# Patient Record
Sex: MALE | Race: WHITE | NOT HISPANIC OR LATINO | Employment: FULL TIME | URBAN - METROPOLITAN AREA
[De-identification: names, ages, dates, MRNs, and addresses within clinical notes are randomized per-mention and may not be internally consistent; named-entity substitution may affect disease eponyms.]

---

## 2024-03-08 ENCOUNTER — LAB (OUTPATIENT)
Dept: LAB | Facility: LAB | Age: 29
End: 2024-03-08
Payer: COMMERCIAL

## 2024-04-24 ENCOUNTER — OFFICE VISIT (OUTPATIENT)
Dept: PRIMARY CARE | Facility: CLINIC | Age: 29
End: 2024-04-24
Payer: COMMERCIAL

## 2024-04-24 VITALS — BODY MASS INDEX: 29.29 KG/M2 | WEIGHT: 210 LBS | SYSTOLIC BLOOD PRESSURE: 100 MMHG | DIASTOLIC BLOOD PRESSURE: 84 MMHG

## 2024-04-24 DIAGNOSIS — J45.909 ASTHMA, UNSPECIFIED ASTHMA SEVERITY, UNSPECIFIED WHETHER COMPLICATED, UNSPECIFIED WHETHER PERSISTENT (HHS-HCC): ICD-10-CM

## 2024-04-24 DIAGNOSIS — Z00.00 HEALTHCARE MAINTENANCE: Primary | ICD-10-CM

## 2024-04-24 DIAGNOSIS — R00.2 PALPITATIONS: ICD-10-CM

## 2024-04-24 PROBLEM — F99 PSYCHIATRIC SYMPTOMS: Status: ACTIVE | Noted: 2024-04-24

## 2024-04-24 PROBLEM — J03.91 RECURRENT TONSILLITIS: Status: ACTIVE | Noted: 2024-04-24

## 2024-04-24 PROBLEM — Z86.59 HISTORY OF MENTAL DISORDER: Status: ACTIVE | Noted: 2024-04-24

## 2024-04-24 PROBLEM — M79.631 PAIN OF RIGHT FOREARM: Status: ACTIVE | Noted: 2024-04-24

## 2024-04-24 PROBLEM — Z51.81 ENCOUNTER FOR THERAPEUTIC DRUG MONITORING: Status: ACTIVE | Noted: 2024-04-24

## 2024-04-24 PROBLEM — F39 MOOD DISORDER (CMS-HCC): Status: ACTIVE | Noted: 2021-11-30

## 2024-04-24 PROBLEM — J03.90 TONSILLITIS: Status: ACTIVE | Noted: 2024-04-24

## 2024-04-24 PROBLEM — G47.00 INSOMNIA: Status: ACTIVE | Noted: 2024-04-24

## 2024-04-24 PROBLEM — F98.8 ATTENTION DEFICIT DISORDER: Status: ACTIVE | Noted: 2022-01-07

## 2024-04-24 PROBLEM — F99 PSYCHIATRIC COMPLAINT: Status: ACTIVE | Noted: 2024-04-24

## 2024-04-24 PROBLEM — S62.001K: Status: ACTIVE | Noted: 2021-12-20

## 2024-04-24 PROBLEM — E66.3 OVERWEIGHT WITH BODY MASS INDEX (BMI) 25.0-29.9: Status: ACTIVE | Noted: 2024-04-24

## 2024-04-24 PROBLEM — F31.9 BIPOLAR DEPRESSION (MULTI): Status: ACTIVE | Noted: 2024-04-24

## 2024-04-24 PROBLEM — F31.30 BIPOLAR DISORDER, MOST RECENT EPISODE DEPRESSED (MULTI): Status: ACTIVE | Noted: 2024-04-24

## 2024-04-24 PROBLEM — F41.9 ANXIETY: Status: ACTIVE | Noted: 2024-04-24

## 2024-04-24 PROCEDURE — 99385 PREV VISIT NEW AGE 18-39: CPT | Performed by: STUDENT IN AN ORGANIZED HEALTH CARE EDUCATION/TRAINING PROGRAM

## 2024-04-24 PROCEDURE — 1036F TOBACCO NON-USER: CPT | Performed by: STUDENT IN AN ORGANIZED HEALTH CARE EDUCATION/TRAINING PROGRAM

## 2024-04-24 PROCEDURE — 93000 ELECTROCARDIOGRAM COMPLETE: CPT | Performed by: STUDENT IN AN ORGANIZED HEALTH CARE EDUCATION/TRAINING PROGRAM

## 2024-04-24 RX ORDER — DESVENLAFAXINE SUCCINATE 50 MG/1
TABLET, EXTENDED RELEASE ORAL
COMMUNITY
Start: 2023-07-18 | End: 2024-04-24 | Stop reason: ALTCHOICE

## 2024-04-24 RX ORDER — TRAZODONE HYDROCHLORIDE 150 MG/1
TABLET ORAL
COMMUNITY
Start: 2022-11-30 | End: 2024-04-24 | Stop reason: ALTCHOICE

## 2024-04-24 RX ORDER — DESVENLAFAXINE SUCCINATE 25 MG/1
TABLET, EXTENDED RELEASE ORAL
COMMUNITY
Start: 2023-06-23 | End: 2024-04-24 | Stop reason: ALTCHOICE

## 2024-04-24 RX ORDER — HYDROXYZINE PAMOATE 25 MG/1
CAPSULE ORAL
COMMUNITY
Start: 2021-11-30 | End: 2024-04-24 | Stop reason: ALTCHOICE

## 2024-04-24 RX ORDER — METHYLPHENIDATE HYDROCHLORIDE 20 MG/1
20 TABLET ORAL
COMMUNITY
Start: 2024-03-25 | End: 2024-04-24 | Stop reason: ALTCHOICE

## 2024-04-24 RX ORDER — BUSPIRONE HYDROCHLORIDE 15 MG/1
TABLET ORAL
COMMUNITY
Start: 2021-12-02 | End: 2024-04-24 | Stop reason: ALTCHOICE

## 2024-04-24 RX ORDER — LAMOTRIGINE 100 MG/1
TABLET ORAL
COMMUNITY
Start: 2024-04-04

## 2024-04-24 RX ORDER — METHYLPHENIDATE 2.2 MG/H
PATCH TRANSDERMAL
COMMUNITY
End: 2024-04-24 | Stop reason: ALTCHOICE

## 2024-04-24 RX ORDER — CITALOPRAM 10 MG/1
1 TABLET ORAL DAILY
COMMUNITY
Start: 2022-10-02 | End: 2024-04-24 | Stop reason: WASHOUT

## 2024-04-24 RX ORDER — OLANZAPINE 5 MG/1
5 TABLET ORAL
COMMUNITY
Start: 2022-07-16

## 2024-04-24 RX ORDER — LISDEXAMFETAMINE DIMESYLATE 60 MG/1
60 CAPSULE ORAL DAILY
COMMUNITY
Start: 2023-11-21 | End: 2024-04-24 | Stop reason: ALTCHOICE

## 2024-04-24 RX ORDER — METHYLPHENIDATE HYDROCHLORIDE 18 MG/1
TABLET ORAL
COMMUNITY
Start: 2021-12-30 | End: 2024-04-24 | Stop reason: ALTCHOICE

## 2024-04-24 RX ORDER — METHYLPHENIDATE 3.3 MG/H
PATCH TRANSDERMAL
COMMUNITY
Start: 2024-04-01

## 2024-04-24 RX ORDER — LAMOTRIGINE 25 MG/1
TABLET ORAL
COMMUNITY
Start: 2024-04-04

## 2024-04-24 RX ORDER — OLANZAPINE 2.5 MG/1
2.5 TABLET ORAL NIGHTLY
COMMUNITY
Start: 2023-08-14 | End: 2024-04-24 | Stop reason: ALTCHOICE

## 2024-04-24 RX ORDER — METHYLPHENIDATE HYDROCHLORIDE 10 MG/1
TABLET ORAL
COMMUNITY
Start: 2024-03-22

## 2024-04-24 RX ORDER — LISDEXAMFETAMINE DIMESYLATE 40 MG/1
1 TABLET, CHEWABLE ORAL DAILY
COMMUNITY
Start: 2023-11-08 | End: 2024-04-24 | Stop reason: ALTCHOICE

## 2024-04-24 NOTE — PROGRESS NOTES
Establish new and irregular heart beat 1xweek    Subjective   Patient ID: Scotty Pastrana is a 29 y.o. male who presents for Establish Care and Irregular Heart Beat.    HPI     Presents to establish care    Past medical history: Depression, ADHD  Past surgical history: Right wrist, tonsillectomy  No known drug allergies  Social history: Works for [employer], , been there about 1.5 years, went to [employer], graduated 2018, also had been loan advisor, nicotine pouches daily, several times, occasion alcohol use, no recreational drug use  Family history: Coronary artery disease, hypertension    Follows with psychiatry for depression and ADHD. Has been on a number of medications previously and kept switching to different ones to see if could be more beneficial Lamictal was the most recent addition and started at 25mg daily 2-3 months ago and increased up to current dose    Done 2 rounds of TMS, also neuro feedback    Over the last 1 to 2 weeks has noted occasional palpitations.  We usually feel 1 beat and then will go back to normal.  Has been more aware of the palpitations at night.  If he does control his breathing will have improvement with it.  Have occurred a little less often over the last few days.  Had been about every 20 minutes at times.  Notices less when he is out for walks.  No associated lightheadedness, dizziness, chest pain, shortness of breath    Review of Systems    8 point review of systems is otherwise negative unless mentioned on HPI      Objective   /84   Wt 95.3 kg (210 lb)   BMI 29.29 kg/m²     Physical Exam    General: No acute distress  HEENT: EOMI  CV: Regular rate and rhythm, normal S1 and S2, no murmurs  Pulm: Clear to auscultation bilaterally, no wheezings, rales or rhonchi  Abd: Nondistended  MSK: 5/5 strength in all extremities  Skin: No rashes   Lymphatic: No lymphadenopathy      Assessment/Plan        Occasional feeling of palpitations  -EKG with HR 83, normal sinus  rhythm, normal intervals, no ischemic changes   -Discussed most common etiology which would be PVCs  -Discussed option of wearing Holter monitor for 48 hours if palpitations change, become more frequent or bothersome  -Also consideration with most recent medication addition and up titration of Lamictal if could potentially be side effect that will discuss with his mental health provider    Depression  ADHD  -Follows with psychiatry  -Most recent medication addition was lamotrigine, was initially 25 mg daily and has been increased up to 125 mg daily  -On Ritalin for ADHD  -Also takes olanzapine 5 mg daily  -Reports being on different antidepressant medications in the past that were not effective.  Denies any side effects to particular antidepressants    RTC yearly or earlier as needed    This note was dictated by speech recognition. Minor errors in transcription may be present.

## 2024-08-14 ENCOUNTER — OFFICE VISIT (OUTPATIENT)
Dept: PRIMARY CARE | Facility: CLINIC | Age: 29
End: 2024-08-14
Payer: COMMERCIAL

## 2024-08-14 VITALS
DIASTOLIC BLOOD PRESSURE: 86 MMHG | BODY MASS INDEX: 30.24 KG/M2 | SYSTOLIC BLOOD PRESSURE: 134 MMHG | WEIGHT: 216 LBS | HEIGHT: 71 IN

## 2024-08-14 DIAGNOSIS — F31.30 BIPOLAR DISORDER, MOST RECENT EPISODE DEPRESSED (MULTI): ICD-10-CM

## 2024-08-14 DIAGNOSIS — R00.2 PALPITATIONS: Primary | ICD-10-CM

## 2024-08-14 DIAGNOSIS — R53.1 RIGHT SIDED WEAKNESS: ICD-10-CM

## 2024-08-14 PROCEDURE — 3008F BODY MASS INDEX DOCD: CPT | Performed by: STUDENT IN AN ORGANIZED HEALTH CARE EDUCATION/TRAINING PROGRAM

## 2024-08-14 PROCEDURE — 99213 OFFICE O/P EST LOW 20 MIN: CPT | Performed by: STUDENT IN AN ORGANIZED HEALTH CARE EDUCATION/TRAINING PROGRAM

## 2024-08-14 NOTE — PROGRESS NOTES
"Subjective   Patient ID: Scotty Pastrana is a 29 y.o. male who presents for Follow-up (Right wrist discomfort/\"Right side of body , feels weaker\").    HPI     Presents for follow-up    Has broken right wrist twice, has not been able to do push-ups since and can bother for time to time.    Has felt right side has been weaker than right for a number of years     Has still been having palpitations, more at night    Review of Systems    8 point review of systems is otherwise negative unless mentioned on HPI      Objective   /86   Ht 1.803 m (5' 11\")   Wt 98 kg (216 lb)   BMI 30.13 kg/m²     Physical Exam    General: No acute distress  HEENT: EOMI  CV: Regular rate and rhythm, normal S1 and S2, no murmurs  Pulm: Clear to auscultation bilaterally, no wheezings, rales or rhonchi  Abd: Nondistended  MSK: 5/5 strength in all extremities  Skin: No rashes   Lymphatic: No lymphadenopathy      Assessment/Plan       Occasional feeling of palpitations  -EKG with HR 83, normal sinus rhythm, normal intervals, no ischemic changes   -Discussed most common etiology which would be PVCs  -With symptoms still persisting, will order 7 day event monitor   -Also consideration with most recent medication addition and up titration of Lamictal if could potentially be side effect that he had discussed with his mental health provider    Feeling like right side of body is weaker than left  -Referral placed to neurology      Depression  ADHD  -Follows with psychiatry  -Most recent medication addition was lamotrigine, was initially 25 mg daily and has been increased up to 125 mg daily  -On Ritalin for ADHD  -Also takes olanzapine 5 mg daily  -Reports being on different antidepressant medications in the past that were not effective.  Denies any side effects to particular antidepressants     RTC yearly or earlier as needed     This note was dictated by speech recognition. Minor errors in transcription may be present.       "

## 2024-08-28 ENCOUNTER — ANCILLARY PROCEDURE (OUTPATIENT)
Dept: CARDIOLOGY | Facility: CLINIC | Age: 29
End: 2024-08-28
Payer: COMMERCIAL

## 2024-08-28 DIAGNOSIS — R00.2 PALPITATIONS: ICD-10-CM

## 2024-08-28 PROCEDURE — 93243 EXT ECG>48HR<7D SCAN A/R: CPT

## 2024-08-28 PROCEDURE — 93244 EXT ECG>48HR<7D REV&INTERPJ: CPT | Performed by: INTERNAL MEDICINE

## 2024-09-18 ENCOUNTER — PREP FOR PROCEDURE (OUTPATIENT)
Dept: ORTHOPEDIC SURGERY | Facility: CLINIC | Age: 29
End: 2024-09-18
Payer: COMMERCIAL

## 2024-09-20 ENCOUNTER — APPOINTMENT (OUTPATIENT)
Dept: PRIMARY CARE | Facility: CLINIC | Age: 29
End: 2024-09-20
Payer: COMMERCIAL

## 2024-11-05 ENCOUNTER — APPOINTMENT (OUTPATIENT)
Dept: OTOLARYNGOLOGY | Facility: CLINIC | Age: 29
End: 2024-11-05
Payer: COMMERCIAL

## 2024-11-05 VITALS
SYSTOLIC BLOOD PRESSURE: 134 MMHG | BODY MASS INDEX: 30.13 KG/M2 | DIASTOLIC BLOOD PRESSURE: 91 MMHG | TEMPERATURE: 97.5 F | HEIGHT: 71 IN

## 2024-11-05 DIAGNOSIS — J34.3 HYPERTROPHY OF BOTH INFERIOR NASAL TURBINATES: ICD-10-CM

## 2024-11-05 DIAGNOSIS — F41.9 ANXIETY: ICD-10-CM

## 2024-11-05 DIAGNOSIS — J30.9 CHRONIC ALLERGIC RHINITIS: Primary | ICD-10-CM

## 2024-11-05 PROCEDURE — 1036F TOBACCO NON-USER: CPT | Performed by: OTOLARYNGOLOGY

## 2024-11-05 PROCEDURE — 99204 OFFICE O/P NEW MOD 45 MIN: CPT | Performed by: OTOLARYNGOLOGY

## 2024-11-05 RX ORDER — OLANZAPINE AND SAMIDORPHAN L-MALATE 5; 10 MG/1; MG/1
1 TABLET, FILM COATED ORAL NIGHTLY
COMMUNITY
Start: 2024-10-12

## 2024-11-05 RX ORDER — FLUTICASONE PROPIONATE 50 MCG
2 SPRAY, SUSPENSION (ML) NASAL DAILY
Qty: 48 ML | Refills: 3 | Status: SHIPPED | OUTPATIENT
Start: 2024-11-05 | End: 2025-02-03

## 2024-11-05 NOTE — PROGRESS NOTES
Impression:  1. Chronic allergic rhinitis        2. Hypertrophy of both inferior nasal turbinates         3.  Anxiety    RECOMMENDATIONS/PLAN :  I explained to the patient that the pink swollen area in his nose is his normal anatomy consisting of inferior turbinates.  They are mildly enlarged so we will start him on Flonase nasal spray-2 puffs each nostril daily.  He can also rinse his nose using saline rinses to remove any mucus.  He can otherwise follow-up with his family physician as needed      **This electronic medical record note was created with the use of voice recognition software.  Despite proofreading, typographical or grammatical errors may be present that could affect meaning of content **    Subjective   Patient ID:     Scotty Pastrana is a 29 y.o. male who presents to the office today stating that he thought he saw something pink inside of his right nostril that was swollen.  He has not seen his family physician as of yet.  He denies repetitive sinus infections or any fever or chills.  He does have some nasal congestion.  No pus draining from the sinuses.  No history of nasal polyps.  No bleeding from his nose.  He has not been on any nasal steroid sprays.    ROS:  A detailed 12 system review of systems is noted on the intake form has been reviewed with the patient with details noted in the HPI and scanned into the patient's medical record.    Objective     Past Medical History:   Diagnosis Date    Anxiety     Anxiety disorder, unspecified     Anxiety    Asthma     Bipolar 1 disorder (Multi)     Insomnia     Mood disorder (CMS-HCC)     Overweight     Personal history of other mental and behavioral disorders     History of attention deficit disorder        Past Surgical History:   Procedure Laterality Date    OTHER SURGICAL HISTORY  11/17/2015    Surgery Incision And Drainage Of Abscess Tonsillar    TONSILLECTOMY      WRIST SURGERY          No Known Allergies       Current Outpatient Medications:      "lamoTRIgine (LaMICtal) 100 mg tablet, , Disp: , Rfl:     lamoTRIgine (LaMICtal) 25 mg tablet, , Disp: , Rfl:     Lybalvi 5-10 mg tablet, Take 1 tablet by mouth once daily at bedtime., Disp: , Rfl:     methylphenidate (Daytrana) 30 mg/9 hr patch, UNWRAP AND APPLY 1 PATCH TO SKIN OF UPPER ARM ONCE DAILY., Disp: , Rfl:     methylphenidate (Ritalin) 10 mg tablet, , Disp: , Rfl:     OLANZapine (ZyPREXA) 5 mg tablet, Take 1 tablet (5 mg) by mouth., Disp: , Rfl:      Tobacco Use: Low Risk  (11/5/2024)    Patient History     Smoking Tobacco Use: Never     Smokeless Tobacco Use: Never     Passive Exposure: Not on file        Alcohol Use: Not on file        Social History     Substance and Sexual Activity   Drug Use Not Currently        Physical Exam:  Visit Vitals  BP (!) 134/91   Temp 36.4 °C (97.5 °F) (Temporal)   Ht 1.803 m (5' 11\")   BMI 30.13 kg/m²   Smoking Status Never   BSA 2.22 m²      General: Patient is alert, oriented, cooperative in no apparent distress.  Head: Normocephalic, atraumatic.  Eyes: PERRL, EOMI, Conjunctiva is clear. No nystagmus.  Ears: Right Ear-- Pinna is normal.  External auditory canal is patent. Tympanic membrane is [intact, translucent and has good mobility with my pneumatic otoscope. No effusion].  Mastoid is nontender.  Left ear-- Pinna is normal.  External auditory canal is patent. Tympanic membrane is [intact, translucent and has good mobility with my pneumatic otoscope.  No effusion].  Mastoid is nontender.  Nose: Septum is relatively straight.  No septal perforation or lesions. No septal hematoma/ seroma.  No signs of bleeding.  Inferior turbinates are moderately swollen and pale..   No evidence of intranasal polyps.  No infectious drainage.  Throat:  Floor of mouth is clear, no masses.  Tongue appears normal, no lesions or masses. Gums, gingiva, buccal mucosa appear pink and moist, no lesions. Teeth are in good repair.  No obvious dental infections.  Peritonsillar regions appear " symmetric without swelling.  Hard and soft palate appear normal, no obvious cleft. Uvula is midline.  Oropharynx: No lesions. Retropharyngeal wall is flat.  No active postnasal drip.  Neck: Supple,  no lymphadenopathy.  No masses.  Salivary Glands: Symmetric bilaterally.  No palpable masses.  No evidence of acute infection or salivary stones  Neurologic: Cranial Nerves 2-12 are grossly intact without focal deficits. Cerebellar function testing is normal.     Results:   []    Procedure:   []    Samuel Garcia, DO